# Patient Record
Sex: MALE | Race: WHITE | NOT HISPANIC OR LATINO | Employment: FULL TIME | URBAN - METROPOLITAN AREA
[De-identification: names, ages, dates, MRNs, and addresses within clinical notes are randomized per-mention and may not be internally consistent; named-entity substitution may affect disease eponyms.]

---

## 2023-09-06 ENCOUNTER — OFFICE VISIT (OUTPATIENT)
Dept: PRIMARY CARE | Facility: CLINIC | Age: 73
End: 2023-09-06
Payer: MEDICARE

## 2023-09-06 DIAGNOSIS — G47.9 SLEEP DISTURBANCE: Primary | ICD-10-CM

## 2023-09-06 DIAGNOSIS — G44.209 TENSION HEADACHE: ICD-10-CM

## 2023-09-06 DIAGNOSIS — R06.83 SNORING: ICD-10-CM

## 2023-09-06 DIAGNOSIS — G47.10 HYPERSOMNIA, UNSPECIFIED: ICD-10-CM

## 2023-09-06 DIAGNOSIS — N48.6 PEYRONIE DISEASE: ICD-10-CM

## 2023-09-06 PROCEDURE — 99213 OFFICE O/P EST LOW 20 MIN: CPT | Performed by: INTERNAL MEDICINE

## 2023-09-13 DIAGNOSIS — G47.9 SLEEP DISTURBANCE: Primary | ICD-10-CM

## 2023-09-27 PROBLEM — F41.8 DEPRESSION WITH ANXIETY: Status: ACTIVE | Noted: 2023-09-27

## 2023-09-27 PROBLEM — B36.0 TINEA VERSICOLOR: Status: ACTIVE | Noted: 2023-09-27

## 2023-09-27 PROBLEM — N20.0 NEPHROLITHIASIS: Status: ACTIVE | Noted: 2023-09-27

## 2023-09-27 PROBLEM — F41.9 ANXIETY: Status: ACTIVE | Noted: 2023-09-27

## 2023-09-27 PROBLEM — G47.00 INSOMNIA: Status: ACTIVE | Noted: 2023-09-27

## 2023-09-27 PROBLEM — M54.2 CERVICALGIA: Status: ACTIVE | Noted: 2023-09-27

## 2023-09-27 PROBLEM — R31.9 HEMATURIA: Status: ACTIVE | Noted: 2023-09-27

## 2023-09-27 PROBLEM — M19.071 PRIMARY OSTEOARTHRITIS OF BOTH FEET: Status: ACTIVE | Noted: 2023-09-27

## 2023-09-27 PROBLEM — K76.0 FATTY LIVER: Status: ACTIVE | Noted: 2023-09-27

## 2023-09-27 PROBLEM — N40.0 BENIGN PROSTATIC HYPERPLASIA: Status: ACTIVE | Noted: 2023-09-27

## 2023-09-27 PROBLEM — E78.49 ESSENTIAL FAMILIAL HYPERLIPIDEMIA: Status: ACTIVE | Noted: 2023-09-27

## 2023-09-27 PROBLEM — J45.909 ASTHMATIC BRONCHITIS (HHS-HCC): Status: ACTIVE | Noted: 2023-09-27

## 2023-09-27 PROBLEM — J45.909 REACTIVE AIRWAY DISEASE (HHS-HCC): Status: ACTIVE | Noted: 2023-09-27

## 2023-09-27 PROBLEM — M19.072 PRIMARY OSTEOARTHRITIS OF BOTH FEET: Status: ACTIVE | Noted: 2023-09-27

## 2023-09-27 PROBLEM — H69.90 EUSTACHIAN TUBE DYSFUNCTION: Status: ACTIVE | Noted: 2023-09-27

## 2023-09-27 PROBLEM — R42 DIZZINESS: Status: ACTIVE | Noted: 2023-09-27

## 2023-09-27 PROBLEM — R00.2 PALPITATIONS: Status: ACTIVE | Noted: 2023-09-27

## 2023-09-27 PROBLEM — N52.9 ED (ERECTILE DYSFUNCTION): Status: ACTIVE | Noted: 2023-09-27

## 2023-09-27 PROBLEM — I10 BENIGN ESSENTIAL HYPERTENSION: Status: ACTIVE | Noted: 2023-09-27

## 2023-09-27 PROBLEM — E29.1 PRIMARY MALE HYPOGONADISM: Status: ACTIVE | Noted: 2023-09-27

## 2023-09-27 PROBLEM — M10.9 GOUT: Status: ACTIVE | Noted: 2023-09-27

## 2023-09-27 RX ORDER — POTASSIUM CITRATE 15 MEQ/1
15 TABLET, EXTENDED RELEASE ORAL 2 TIMES DAILY
COMMUNITY
Start: 2020-12-07

## 2023-09-27 RX ORDER — COLCHICINE 0.6 MG/1
1 TABLET ORAL DAILY
COMMUNITY
Start: 2021-07-30

## 2023-09-27 RX ORDER — HYOSCYAMINE SULFATE 0.12 MG/1
0.12 TABLET, ORALLY DISINTEGRATING ORAL EVERY 4 HOURS PRN
COMMUNITY
Start: 2020-11-24

## 2023-09-27 RX ORDER — PHENYLPROPANOLAMINE/CLEMASTINE 75-1.34MG
TABLET, EXTENDED RELEASE ORAL EVERY 6 HOURS PRN
COMMUNITY

## 2023-09-27 RX ORDER — CITALOPRAM 20 MG/1
20 TABLET, FILM COATED ORAL
COMMUNITY
Start: 2014-01-06

## 2023-09-27 RX ORDER — CITALOPRAM 40 MG/1
40 TABLET, FILM COATED ORAL DAILY
COMMUNITY
End: 2024-02-15

## 2023-09-27 RX ORDER — KETOCONAZOLE 200 MG/1
200 TABLET ORAL DAILY
COMMUNITY

## 2023-09-27 RX ORDER — ALLOPURINOL 300 MG/1
300 TABLET ORAL DAILY
COMMUNITY
End: 2023-11-09 | Stop reason: SDUPTHER

## 2023-09-27 RX ORDER — ALBUTEROL SULFATE 90 UG/1
2 AEROSOL, METERED RESPIRATORY (INHALATION) AS NEEDED
COMMUNITY
Start: 2019-11-07

## 2023-09-27 RX ORDER — ARIPIPRAZOLE 5 MG/1
1 TABLET ORAL NIGHTLY
COMMUNITY
Start: 2018-01-29

## 2023-09-27 RX ORDER — IBUPROFEN 600 MG/1
1 TABLET ORAL EVERY 8 HOURS PRN
COMMUNITY
Start: 2019-11-07

## 2023-09-27 RX ORDER — DOCUSATE SODIUM 100 MG/1
100 CAPSULE, LIQUID FILLED ORAL 2 TIMES DAILY
COMMUNITY
Start: 2020-11-24

## 2023-09-27 RX ORDER — MONTELUKAST SODIUM 10 MG/1
10 TABLET ORAL
COMMUNITY
Start: 2022-07-30

## 2023-09-27 RX ORDER — CARISOPRODOL 350 MG/1
350 TABLET ORAL NIGHTLY PRN
COMMUNITY
Start: 2023-02-02

## 2023-09-27 RX ORDER — BLOOD-GLUCOSE METER
KIT MISCELLANEOUS
COMMUNITY
Start: 2023-02-13

## 2023-09-27 RX ORDER — BENZONATATE 100 MG/1
1 CAPSULE ORAL EVERY 6 HOURS PRN
COMMUNITY
Start: 2019-11-07

## 2023-09-27 RX ORDER — TAMSULOSIN HYDROCHLORIDE 0.4 MG/1
0.4 CAPSULE ORAL
COMMUNITY
Start: 2020-11-17

## 2023-09-27 RX ORDER — FLUTICASONE PROPIONATE AND SALMETEROL 250; 50 UG/1; UG/1
1 POWDER RESPIRATORY (INHALATION) 2 TIMES DAILY
COMMUNITY
Start: 2022-08-08

## 2023-09-27 RX ORDER — PREDNISONE 10 MG/1
1 TABLET ORAL 2 TIMES DAILY
COMMUNITY
Start: 2019-12-20

## 2023-09-27 RX ORDER — DOXYCYCLINE 100 MG/1
100 CAPSULE ORAL EVERY 12 HOURS
COMMUNITY
Start: 2019-12-20 | End: 2023-12-29 | Stop reason: SDUPTHER

## 2023-09-27 RX ORDER — SILDENAFIL 50 MG/1
1 TABLET, FILM COATED ORAL AS NEEDED
COMMUNITY
Start: 2017-11-20

## 2023-10-02 ENCOUNTER — APPOINTMENT (OUTPATIENT)
Dept: PULMONOLOGY | Facility: CLINIC | Age: 73
End: 2023-10-02
Payer: MEDICARE

## 2023-10-12 ENCOUNTER — TELEPHONE (OUTPATIENT)
Dept: PRIMARY CARE | Facility: CLINIC | Age: 73
End: 2023-10-12
Payer: MEDICARE

## 2023-10-16 ENCOUNTER — APPOINTMENT (OUTPATIENT)
Dept: UROLOGY | Facility: HOSPITAL | Age: 73
End: 2023-10-16
Payer: MEDICARE

## 2023-10-30 ENCOUNTER — APPOINTMENT (OUTPATIENT)
Dept: UROLOGY | Facility: HOSPITAL | Age: 73
End: 2023-10-30
Payer: MEDICARE

## 2023-11-09 DIAGNOSIS — Z00.00 HEALTH CARE MAINTENANCE: Primary | ICD-10-CM

## 2023-11-09 RX ORDER — ALLOPURINOL 300 MG/1
300 TABLET ORAL DAILY
Qty: 90 TABLET | Refills: 1 | Status: SHIPPED | OUTPATIENT
Start: 2023-11-09 | End: 2024-05-27

## 2023-12-18 ENCOUNTER — APPOINTMENT (OUTPATIENT)
Dept: UROLOGY | Facility: HOSPITAL | Age: 73
End: 2023-12-18
Payer: MEDICARE

## 2023-12-29 ENCOUNTER — OFFICE VISIT (OUTPATIENT)
Dept: PRIMARY CARE | Facility: CLINIC | Age: 73
End: 2023-12-29
Payer: MEDICARE

## 2023-12-29 VITALS — DIASTOLIC BLOOD PRESSURE: 76 MMHG | SYSTOLIC BLOOD PRESSURE: 124 MMHG

## 2023-12-29 DIAGNOSIS — F41.9 ANXIETY: ICD-10-CM

## 2023-12-29 DIAGNOSIS — G47.9 SLEEP DISTURBANCE: ICD-10-CM

## 2023-12-29 DIAGNOSIS — Z12.11 ENCOUNTER FOR SCREENING FOR MALIGNANT NEOPLASM OF COLON: ICD-10-CM

## 2023-12-29 DIAGNOSIS — R06.83 SNORING: ICD-10-CM

## 2023-12-29 DIAGNOSIS — J45.21 AB (ASTHMATIC BRONCHITIS), MILD INTERMITTENT, WITH ACUTE EXACERBATION (HHS-HCC): ICD-10-CM

## 2023-12-29 DIAGNOSIS — G47.10 HYPERSOMNIA, UNSPECIFIED: ICD-10-CM

## 2023-12-29 DIAGNOSIS — B36.0 TINEA VERSICOLOR: ICD-10-CM

## 2023-12-29 DIAGNOSIS — Z00.00 HEALTH CARE MAINTENANCE: Primary | ICD-10-CM

## 2023-12-29 PROCEDURE — 3074F SYST BP LT 130 MM HG: CPT | Performed by: INTERNAL MEDICINE

## 2023-12-29 PROCEDURE — 99214 OFFICE O/P EST MOD 30 MIN: CPT | Performed by: INTERNAL MEDICINE

## 2023-12-29 PROCEDURE — 3078F DIAST BP <80 MM HG: CPT | Performed by: INTERNAL MEDICINE

## 2023-12-29 RX ORDER — KETOCONAZOLE 200 MG/1
200 TABLET ORAL DAILY
Qty: 7 TABLET | Refills: 0 | Status: SHIPPED | OUTPATIENT
Start: 2023-12-29 | End: 2024-01-05

## 2023-12-29 RX ORDER — DOXYCYCLINE 100 MG/1
100 CAPSULE ORAL EVERY 12 HOURS
Qty: 14 CAPSULE | Refills: 0 | Status: SHIPPED | OUTPATIENT
Start: 2023-12-29

## 2023-12-29 NOTE — PROGRESS NOTES
Subjective   Patient ID: Roman Gorman is a 73 y.o. male who presents for No chief complaint on file..    HPI sick visit same day after hours no staff has had some cough x 3 weeks no chest pain no shortness of breath no fever has had outbreak of his tinea versicolor feels his Celexa is not working after 20 some years for his anxiety has not had any kidney stones desires he colonoscopy would like to do his home sleep study for sleep issues and tiredness    Review of Systems    Objective   There were no vitals taken for this visit.    Physical Exam vital signs noted alert and oriented x 3 NCAT no coryza nares without discharge OP benign no JVD chest clear to auscultation with some deep bronchial breath sounds no wheezing CV regular rate and rhythm S1-S2 without murmur gallop or rub extremities no clubbing cyanosis or edema normal distal pulses skin typical tinea versicolor    Assessment/Plan impression acute bacterial asthmatic bronchitis anxiety other diagnoses rule out sleep apnea TV  Plan okay for antibiotic to local pharmacy see EMR and Mucinex twice daily then after period of 2 to 3 days may use ketoconazole see EMR for the tinea versicolor  Then will deal with the Celexa and change and/or follow-up with psychiatry  Colonoscopy requisition (completed)  Consideration for home sleep study requisition to be made and follow-up (check)  Diet weight loss exercise and recheck based on above  consider inhaler if abx and mucinex are not enough for symptoms.  Recheck based on above  tt40 cc21

## 2024-01-08 DIAGNOSIS — Z12.11 SPECIAL SCREENING FOR MALIGNANT NEOPLASMS, COLON: ICD-10-CM

## 2024-01-08 RX ORDER — SODIUM PICOSULFATE, MAGNESIUM OXIDE, AND ANHYDROUS CITRIC ACID 12; 3.5; 1 G/175ML; G/175ML; MG/175ML
1 LIQUID ORAL SEE ADMIN INSTRUCTIONS
Qty: 175 ML | Refills: 0 | Status: SHIPPED | OUTPATIENT
Start: 2024-01-08 | End: 2024-02-23 | Stop reason: SDUPTHER

## 2024-01-11 ENCOUNTER — TELEPHONE (OUTPATIENT)
Dept: PRIMARY CARE | Facility: CLINIC | Age: 74
End: 2024-01-11
Payer: MEDICARE

## 2024-01-11 NOTE — TELEPHONE ENCOUNTER
Patient would like to return as a new patient last o.v. 01/21/2011, has been seeing Dr. Cabrera, cough, blood in stool, labs needed, Advise?

## 2024-01-16 ENCOUNTER — APPOINTMENT (OUTPATIENT)
Dept: PRIMARY CARE | Facility: CLINIC | Age: 74
End: 2024-01-16
Payer: MEDICARE

## 2024-02-13 DIAGNOSIS — F41.9 ANXIETY: Primary | ICD-10-CM

## 2024-02-15 RX ORDER — CITALOPRAM 40 MG/1
40 TABLET, FILM COATED ORAL DAILY
Qty: 90 TABLET | Refills: 1 | Status: SHIPPED | OUTPATIENT
Start: 2024-02-15

## 2024-02-23 DIAGNOSIS — Z12.11 SPECIAL SCREENING FOR MALIGNANT NEOPLASMS, COLON: ICD-10-CM

## 2024-02-23 RX ORDER — SODIUM PICOSULFATE, MAGNESIUM OXIDE, AND ANHYDROUS CITRIC ACID 12; 3.5; 1 G/175ML; G/175ML; MG/175ML
1 LIQUID ORAL SEE ADMIN INSTRUCTIONS
Qty: 175 ML | Refills: 0 | Status: SHIPPED | OUTPATIENT
Start: 2024-02-23

## 2024-02-26 ENCOUNTER — TELEPHONE (OUTPATIENT)
Dept: SLEEP MEDICINE | Facility: HOSPITAL | Age: 74
End: 2024-02-26

## 2024-02-26 ENCOUNTER — OFFICE VISIT (OUTPATIENT)
Dept: GASTROENTEROLOGY | Facility: EXTERNAL LOCATION | Age: 74
End: 2024-02-26
Payer: MEDICARE

## 2024-02-26 DIAGNOSIS — Z12.11 ENCOUNTER FOR SCREENING FOR MALIGNANT NEOPLASM OF COLON: ICD-10-CM

## 2024-02-26 DIAGNOSIS — K57.30 DIVERTICULOSIS OF LARGE INTESTINE WITHOUT DIVERTICULITIS: ICD-10-CM

## 2024-02-26 DIAGNOSIS — K64.8 INTERNAL HEMORRHOIDS: Primary | ICD-10-CM

## 2024-02-26 PROCEDURE — G0121 COLON CA SCRN NOT HI RSK IND: HCPCS | Performed by: INTERNAL MEDICINE

## 2024-02-27 DIAGNOSIS — G47.10 HYPERSOMNIA, UNSPECIFIED: Primary | ICD-10-CM

## 2024-02-27 NOTE — PROGRESS NOTES
Thank you for the update!    One of our schedulers will reach out to the patient to schedule the in-lab sleep study.    Thank you again for referring your patient(s) to  [facility] centers.    Best Regards,  The  Sleep Medicine Team    ===View-only below this line===  ----- Message -----  From: Chance Cabrera MD  Sent: 2/27/2024   1:03 AM EST  To: Paul Longoria  Subject: RE: Inconclusive Home Sleep Apnea Test (HSAT*    Ok to schedule inhouse sleep study; req made.  Thank you

## 2024-05-25 DIAGNOSIS — Z00.00 HEALTH CARE MAINTENANCE: ICD-10-CM

## 2024-05-27 RX ORDER — ALLOPURINOL 300 MG/1
300 TABLET ORAL DAILY
Qty: 90 TABLET | Refills: 1 | Status: SHIPPED | OUTPATIENT
Start: 2024-05-27

## 2024-05-28 ENCOUNTER — OFFICE VISIT (OUTPATIENT)
Dept: PRIMARY CARE | Facility: CLINIC | Age: 74
End: 2024-05-28
Payer: MEDICARE

## 2024-05-28 VITALS — SYSTOLIC BLOOD PRESSURE: 133 MMHG | DIASTOLIC BLOOD PRESSURE: 74 MMHG

## 2024-05-28 DIAGNOSIS — I10 BENIGN ESSENTIAL HYPERTENSION: ICD-10-CM

## 2024-05-28 DIAGNOSIS — F41.8 DEPRESSION WITH ANXIETY: Primary | ICD-10-CM

## 2024-05-28 PROCEDURE — 3075F SYST BP GE 130 - 139MM HG: CPT | Performed by: INTERNAL MEDICINE

## 2024-05-28 PROCEDURE — 99213 OFFICE O/P EST LOW 20 MIN: CPT | Performed by: INTERNAL MEDICINE

## 2024-05-28 PROCEDURE — 3078F DIAST BP <80 MM HG: CPT | Performed by: INTERNAL MEDICINE

## 2024-05-28 NOTE — PROGRESS NOTES
Subjective   Patient ID: Roman Gorman is a 74 y.o. male who presents for No chief complaint on file..    HPI Follow-up visit and sick visit same day after hours no staff no chest pain no shortness of breath had an event that work where became angry and subsequently was advised to undergo some anger management therapy no current chest pain or shortness of breath no physical characteristics or psychological characteristics not sleeping well and eating okay no dysuria    Review of Systems    Objective   There were no vitals taken for this visit.    Physical Exam vital signs noted alert and oriented x 3 NCAT no JVD or bruit chest clear to auscultation CV regular rate and rhythm S1-S2 extremities no clubbing cyanosis or edema normal distal pulses    Assessment/Plan     Impression depression with anxiety hypertension?  Other diagnoses  Plan okay for psychological services referral see EMR regarding anger management which patient is remorseful wishes to help with the process discussed with patient and wife good diet regular exercise increase water consumption no new medications are needed continue to monitor the blood pressure and weight no new medication yet sleep hygiene and follow-up based on above

## 2024-09-18 ENCOUNTER — CLINICAL SUPPORT (OUTPATIENT)
Dept: URGENT CARE | Age: 74
End: 2024-09-18
Payer: MEDICARE

## 2024-09-18 VITALS
SYSTOLIC BLOOD PRESSURE: 168 MMHG | TEMPERATURE: 97.4 F | DIASTOLIC BLOOD PRESSURE: 89 MMHG | OXYGEN SATURATION: 95 % | HEART RATE: 68 BPM

## 2024-09-19 ENCOUNTER — TELEPHONE (OUTPATIENT)
Dept: PRIMARY CARE | Facility: CLINIC | Age: 74
End: 2024-09-19

## 2024-09-19 ENCOUNTER — OFFICE VISIT (OUTPATIENT)
Dept: URGENT CARE | Age: 74
End: 2024-09-19
Payer: MEDICARE

## 2024-09-19 VITALS
DIASTOLIC BLOOD PRESSURE: 94 MMHG | BODY MASS INDEX: 46.75 KG/M2 | SYSTOLIC BLOOD PRESSURE: 144 MMHG | HEART RATE: 74 BPM | RESPIRATION RATE: 17 BRPM | WEIGHT: 240 LBS | OXYGEN SATURATION: 92 %

## 2024-09-19 DIAGNOSIS — I10 HYPERTENSION, UNSPECIFIED TYPE: Primary | ICD-10-CM

## 2024-09-19 ASSESSMENT — ENCOUNTER SYMPTOMS
HYPERTENSION: 1
HEADACHES: 1

## 2024-09-19 NOTE — PATIENT INSTRUCTIONS
Pressure at home you will need the larger cuff which is red instead of blue this could be giving you falsely elevated blood pressures.  Also decrease the amount of Motrin/ibuprofen you are taking down to 2 pills 3 times a day if needed for dental pain.  In between you can take Tylenol up to 1000 mg 3 times a day for the pain.  Watch any caffeine intake or sodium intake this could be falsely elevating your blood pressure also.  Follow-up with your doctor tomorrow at your 1215 visit and have him recheck your blood pressure and make a decision on if you need medications.  The blood pressure reading today is not extremely elevated it is slightly elevated above normal so I will defer any further treatment to your primary care.  If you develop any chest pain, shortness of breath, dizziness or blurred vision go to the ER immediately.

## 2024-09-19 NOTE — PROGRESS NOTES
Subjective   Patient ID: Roman Gorman is a 74 y.o. male. They present today with a chief complaint of Blood Pressure Check (Patient complains of elevated blood pressure X1 day . Patient states he had a dental procedure done yesterday , before and since the procedure he  states his blood pressure has been higher than normal . ).    History of Present Illness  HPI  Patient is a 74-year-old male who has been having issues with his teeth.  He had infection along with her root canal.  Multiple times at the dentist his blood pressure was elevated along with taking it at home.  Patient has been taking his blood pressure with a regular size cuff at home.  He denies any chest pain or shortness of breath.  No dizziness or blurred vision.  He came in because he was worried about his blood pressure.  He has an appointment with his primary care doctor tomorrow.  Past Medical History  Allergies as of 09/19/2024    (No Known Allergies)       (Not in a hospital admission)       Past Medical History:   Diagnosis Date    Personal history of other specified conditions 12/21/2019    History of fatigue       Past Surgical History:   Procedure Laterality Date    COLONOSCOPY  12/14/2015    Complete Colonoscopy    OTHER SURGICAL HISTORY  12/14/2015    Excision Of Lesion Face    TONSILLECTOMY  12/14/2015    Tonsillectomy            Review of Systems  Review of Systems  Gen: No fatigue, fever, sweats.  Head: No headache, trauma.  Eyes: No vision loss, double vision, drainage, eye pain.  ENT: No hearing changes, pain, epistaxis, congestion  Cardiac: No chest pain  Pulmonary: No shortness of breath,  pleuritic pain,   Heme/lymph: No swollen glands  GI: No abdominal pain, nausea, vomiting, diarrhea  : No  dysuria, frequency, urgency, hematuria  Musculoskeletal: No limb pain, joint pain, back pain, joint swelling or stiffness.  Skin: No rashes, pruritus, lumps, lesions.  Neuro: No Numbness, tingling, or weakness.  Psych: No  anxiety      Review of systems is otherwise negative unless stated above or in history of present illness.                             Objective    Vitals:    09/19/24 0955   BP: (!) 144/94   BP Location: Right arm   Patient Position: Sitting   Pulse: 74   Resp: 17   SpO2: 92%   Weight: 109 kg (240 lb)     No LMP for male patient.    Physical Exam  General: Vital signs stable, Pt is alert, no acute distress  Eyes: Conjunctiva normal, PERRL, EOMs intact  HENMT: Normocephalic, atraumatic, external ears and nose normal, no scars or masses.  No mastoid tenderness. Trachea is midline. No meningeal signs, negative Kernig and Brudzinski, moves neck freely.  No sinus tenderness  Resp: Respiratory effort is normal, no retractions, no stridor. Lungs CTA, no wheezes or rhonchi  CV: Heart is regular rate and rhythm.   Skin: No evidence of trauma, skin is warm and dry. No rashes, lesions or ulcers.  Skel: full range of motion of upper and lower extremities.   Neuro: Normal gait, CN II-XII intact, no motor or sensory changes.  Psych: Alert and oriented ×3, judgment is appropriate, normal mood and affect   Procedures    Point of Care Test & Imaging Results from this visit  No results found for this visit on 09/19/24.   No results found.    Diagnostic study results (if any) were reviewed by NEHA Razo.    Assessment/Plan   Allergies, medications, history, and pertinent labs/EKGs/Imaging reviewed by NEHA Razo.     Medical Decision Making  History and physical is consistent with slightly elevated blood pressure.  No signs of heart attack or stroke.  Patient normally has blood pressures that are in normal range when he goes to see his doctor.  More than likely blood pressure has been elevated due to his current situation of his dental infection and pain.  Will defer blood pressure treatment to his PCP but knows if he has any worsening symptoms to go to the ER.    Orders and Diagnoses  There are no diagnoses  linked to this encounter.    Medical Admin Record      Patient disposition: Home    Electronically signed by NEHA Razo  10:10 AM

## 2024-09-20 ENCOUNTER — OFFICE VISIT (OUTPATIENT)
Dept: PRIMARY CARE | Facility: CLINIC | Age: 74
End: 2024-09-20
Payer: MEDICARE

## 2024-09-20 VITALS — HEART RATE: 76 BPM | RESPIRATION RATE: 12 BRPM | SYSTOLIC BLOOD PRESSURE: 158 MMHG | DIASTOLIC BLOOD PRESSURE: 89 MMHG

## 2024-09-20 DIAGNOSIS — I10 BENIGN ESSENTIAL HYPERTENSION: Primary | ICD-10-CM

## 2024-09-20 PROCEDURE — 3079F DIAST BP 80-89 MM HG: CPT | Performed by: INTERNAL MEDICINE

## 2024-09-20 PROCEDURE — 3077F SYST BP >= 140 MM HG: CPT | Performed by: INTERNAL MEDICINE

## 2024-09-20 PROCEDURE — 99213 OFFICE O/P EST LOW 20 MIN: CPT | Performed by: INTERNAL MEDICINE

## 2024-09-20 RX ORDER — METOPROLOL TARTRATE 25 MG/1
25 TABLET, FILM COATED ORAL 2 TIMES DAILY
Qty: 60 TABLET | Refills: 1 | Status: SHIPPED | OUTPATIENT
Start: 2024-09-20 | End: 2025-03-19

## 2024-09-20 NOTE — PROGRESS NOTES
Subjective   Patient ID: Roman Gorman is a 74 y.o. male who presents for No chief complaint on file..    HPI follow-up visit no chest pain no shortness of breath no side effect with medication having endodontal procedures but blood pressure was elevated unable to have the procedure last blood pressure is adequate for their purposes patient himself had no symptoms blood pressures were in the 150s to 160 over around 100 diastolic    Review of Systems    Objective   There were no vitals taken for this visit.  Vital signs noted alert and oriented x 3 NCAT left side of mouth is swollen no JVD chest clear to auscultation cor regular rate and rhythm S1-S2 extremities no clubbing cyanosis or edema normal distal pulses  Physical Exam impression hypertension other diagnoses  Plan okay for metoprolol tartrate 25 mg 1 p.o. twice daily #60 refill 1 watch salt in diet exercise as able then may recheck some blood pressures at home follow-up with dental medicine and recheck afterwards follow-up continuing with same medicines 1 to 4 weeks for continuing care    Assessment/Plan

## 2024-09-24 ENCOUNTER — OFFICE VISIT (OUTPATIENT)
Dept: PRIMARY CARE | Facility: CLINIC | Age: 74
End: 2024-09-24
Payer: MEDICARE

## 2024-09-24 VITALS — DIASTOLIC BLOOD PRESSURE: 96 MMHG | RESPIRATION RATE: 12 BRPM | HEART RATE: 56 BPM | SYSTOLIC BLOOD PRESSURE: 151 MMHG

## 2024-09-24 DIAGNOSIS — E78.49 ESSENTIAL FAMILIAL HYPERLIPIDEMIA: ICD-10-CM

## 2024-09-24 DIAGNOSIS — F41.8 DEPRESSION WITH ANXIETY: ICD-10-CM

## 2024-09-24 DIAGNOSIS — I10 BENIGN ESSENTIAL HYPERTENSION: ICD-10-CM

## 2024-09-24 DIAGNOSIS — R73.02 GLUCOSE INTOLERANCE (IMPAIRED GLUCOSE TOLERANCE): ICD-10-CM

## 2024-09-24 DIAGNOSIS — Z00.00 HEALTH CARE MAINTENANCE: Primary | ICD-10-CM

## 2024-09-24 PROCEDURE — 99214 OFFICE O/P EST MOD 30 MIN: CPT | Performed by: INTERNAL MEDICINE

## 2024-09-24 PROCEDURE — 3077F SYST BP >= 140 MM HG: CPT | Performed by: INTERNAL MEDICINE

## 2024-09-24 PROCEDURE — 3080F DIAST BP >= 90 MM HG: CPT | Performed by: INTERNAL MEDICINE

## 2024-09-24 RX ORDER — AMLODIPINE BESYLATE 5 MG/1
5 TABLET ORAL DAILY
Qty: 30 TABLET | Refills: 5 | Status: SHIPPED | OUTPATIENT
Start: 2024-09-24 | End: 2025-03-23

## 2024-09-24 NOTE — PROGRESS NOTES
Subjective   Patient ID: Roman Gorman is a 74 y.o. male who presents for No chief complaint on file..    HPI follow-up visit no chest pain no shortness of breath Home blood pressures are down by about 20 points pulse rates in the 50s similar to today somewhat less stress trying to watch diet    Review of Systems    Objective   There were no vitals taken for this visit.    Physical Exam vital signs noted alert and oriented x 3 NCAT no JVD or bruit chest clear to auscultation CV regular rate and rhythm S1-S2 without murmur gallop or rub extremities no clubbing cyanosis or edema normal distal pulses    Assessment/Plan     Impression hypertension heart dx glucose intolerance  dep with anxiety other diagnoses hyperlipidemia  Plan he finds that when he takes his Celexa he is not dizzy when he does not take the Celexa he feels dizzy discussed with watching salt in the diet watching saturated fats watching carbohydrates check glycohemoglobin mildly elevated glucose in the past check lipid panel advised on cholesterol profile see prior check comprehensive panel pleasant glucose potassium and kidney function as well as liver function check TSH advised on thyroid and metabolism CT coronary score requisition made and follow-up with cardiology referral made and then recheck blood pressure is pulses weight lipid medication and other at return visit one month  tt40 cc21

## 2024-09-25 ENCOUNTER — LAB (OUTPATIENT)
Dept: LAB | Facility: LAB | Age: 74
End: 2024-09-25
Payer: MEDICARE

## 2024-09-25 DIAGNOSIS — I10 BENIGN ESSENTIAL HYPERTENSION: ICD-10-CM

## 2024-09-25 DIAGNOSIS — R73.02 GLUCOSE INTOLERANCE (IMPAIRED GLUCOSE TOLERANCE): ICD-10-CM

## 2024-09-25 LAB
ALBUMIN SERPL BCP-MCNC: 4.9 G/DL (ref 3.4–5)
ALP SERPL-CCNC: 40 U/L (ref 33–136)
ALT SERPL W P-5'-P-CCNC: 29 U/L (ref 10–52)
ANION GAP SERPL CALC-SCNC: 15 MMOL/L (ref 10–20)
AST SERPL W P-5'-P-CCNC: 19 U/L (ref 9–39)
BILIRUB SERPL-MCNC: 0.9 MG/DL (ref 0–1.2)
BUN SERPL-MCNC: 32 MG/DL (ref 6–23)
CALCIUM SERPL-MCNC: 10.3 MG/DL (ref 8.6–10.6)
CHLORIDE SERPL-SCNC: 102 MMOL/L (ref 98–107)
CHOLEST SERPL-MCNC: 198 MG/DL (ref 0–199)
CHOLESTEROL/HDL RATIO: 4.1
CO2 SERPL-SCNC: 26 MMOL/L (ref 21–32)
CREAT SERPL-MCNC: 1.26 MG/DL (ref 0.5–1.3)
EGFRCR SERPLBLD CKD-EPI 2021: 60 ML/MIN/1.73M*2
ERYTHROCYTE [DISTWIDTH] IN BLOOD BY AUTOMATED COUNT: 13.4 % (ref 11.5–14.5)
EST. AVERAGE GLUCOSE BLD GHB EST-MCNC: 126 MG/DL
GLUCOSE SERPL-MCNC: 109 MG/DL (ref 74–99)
HBA1C MFR BLD: 6 %
HCT VFR BLD AUTO: 48.3 % (ref 41–52)
HDLC SERPL-MCNC: 48.5 MG/DL
HGB BLD-MCNC: 15.7 G/DL (ref 13.5–17.5)
LDLC SERPL CALC-MCNC: 129 MG/DL
MCH RBC QN AUTO: 30.5 PG (ref 26–34)
MCHC RBC AUTO-ENTMCNC: 32.5 G/DL (ref 32–36)
MCV RBC AUTO: 94 FL (ref 80–100)
NON HDL CHOLESTEROL: 150 MG/DL (ref 0–149)
NRBC BLD-RTO: 0 /100 WBCS (ref 0–0)
PLATELET # BLD AUTO: 209 X10*3/UL (ref 150–450)
POTASSIUM SERPL-SCNC: 4.6 MMOL/L (ref 3.5–5.3)
PROT SERPL-MCNC: 7.1 G/DL (ref 6.4–8.2)
RBC # BLD AUTO: 5.15 X10*6/UL (ref 4.5–5.9)
SODIUM SERPL-SCNC: 138 MMOL/L (ref 136–145)
TRIGL SERPL-MCNC: 103 MG/DL (ref 0–149)
TSH SERPL-ACNC: 1.76 MIU/L (ref 0.44–3.98)
VLDL: 21 MG/DL (ref 0–40)
WBC # BLD AUTO: 6.8 X10*3/UL (ref 4.4–11.3)

## 2024-09-25 PROCEDURE — 80053 COMPREHEN METABOLIC PANEL: CPT

## 2024-09-25 PROCEDURE — 36415 COLL VENOUS BLD VENIPUNCTURE: CPT

## 2024-09-25 PROCEDURE — 84443 ASSAY THYROID STIM HORMONE: CPT

## 2024-09-25 PROCEDURE — 80061 LIPID PANEL: CPT

## 2024-09-25 PROCEDURE — 83036 HEMOGLOBIN GLYCOSYLATED A1C: CPT

## 2024-09-25 PROCEDURE — 85027 COMPLETE CBC AUTOMATED: CPT

## 2024-09-27 ENCOUNTER — OFFICE VISIT (OUTPATIENT)
Dept: CARDIOLOGY | Facility: CLINIC | Age: 74
End: 2024-09-27
Payer: MEDICARE

## 2024-09-27 ENCOUNTER — APPOINTMENT (OUTPATIENT)
Dept: CARDIOLOGY | Facility: CLINIC | Age: 74
End: 2024-09-27
Payer: MEDICARE

## 2024-09-27 VITALS
HEIGHT: 70 IN | HEART RATE: 55 BPM | OXYGEN SATURATION: 93 % | DIASTOLIC BLOOD PRESSURE: 84 MMHG | WEIGHT: 243.5 LBS | SYSTOLIC BLOOD PRESSURE: 130 MMHG | BODY MASS INDEX: 34.86 KG/M2

## 2024-09-27 DIAGNOSIS — E66.09 CLASS 1 OBESITY DUE TO EXCESS CALORIES WITHOUT SERIOUS COMORBIDITY WITH BODY MASS INDEX (BMI) OF 34.0 TO 34.9 IN ADULT: ICD-10-CM

## 2024-09-27 DIAGNOSIS — I10 BENIGN ESSENTIAL HYPERTENSION: Primary | ICD-10-CM

## 2024-09-27 LAB
ATRIAL RATE: 51 BPM
P AXIS: 45 DEGREES
P OFFSET: 146 MS
P ONSET: 94 MS
PR INTERVAL: 236 MS
Q ONSET: 212 MS
QRS COUNT: 8 BEATS
QRS DURATION: 88 MS
QT INTERVAL: 450 MS
QTC CALCULATION(BAZETT): 414 MS
QTC FREDERICIA: 426 MS
R AXIS: -47 DEGREES
T AXIS: 30 DEGREES
T OFFSET: 437 MS
VENTRICULAR RATE: 51 BPM

## 2024-09-27 PROCEDURE — 99214 OFFICE O/P EST MOD 30 MIN: CPT | Performed by: HOSPITALIST

## 2024-09-27 PROCEDURE — 3008F BODY MASS INDEX DOCD: CPT | Performed by: HOSPITALIST

## 2024-09-27 PROCEDURE — 1160F RVW MEDS BY RX/DR IN RCRD: CPT | Performed by: HOSPITALIST

## 2024-09-27 PROCEDURE — 1159F MED LIST DOCD IN RCRD: CPT | Performed by: HOSPITALIST

## 2024-09-27 PROCEDURE — 3075F SYST BP GE 130 - 139MM HG: CPT | Performed by: HOSPITALIST

## 2024-09-27 PROCEDURE — 99204 OFFICE O/P NEW MOD 45 MIN: CPT | Performed by: HOSPITALIST

## 2024-09-27 PROCEDURE — 3079F DIAST BP 80-89 MM HG: CPT | Performed by: HOSPITALIST

## 2024-09-27 PROCEDURE — 93005 ELECTROCARDIOGRAM TRACING: CPT | Performed by: HOSPITALIST

## 2024-09-27 ASSESSMENT — COLUMBIA-SUICIDE SEVERITY RATING SCALE - C-SSRS
1. IN THE PAST MONTH, HAVE YOU WISHED YOU WERE DEAD OR WISHED YOU COULD GO TO SLEEP AND NOT WAKE UP?: NO
6. HAVE YOU EVER DONE ANYTHING, STARTED TO DO ANYTHING, OR PREPARED TO DO ANYTHING TO END YOUR LIFE?: NO
2. HAVE YOU ACTUALLY HAD ANY THOUGHTS OF KILLING YOURSELF?: NO

## 2024-09-27 NOTE — PROGRESS NOTES
Subjective   Roman Gorman is a 74 y.o. male with PMH of HLD, HTN, ED, BPH, MDD, anxiety, asthma, and gout, who is here for evaluation for HTN.  Patient reports that he was getting dental work on his BP was 160/120.  Patient was prescribed metoprolol by his PCP, he noticed increased fatigue and heart rate in the 50s.  He was prescribed Norvasc 5 mg but has not started taking it yet.  Patient snores at night but no stopping breathing.  He does not take NSAIDs.  He admits to eat food rich in salt. Patient is on Metoprolol 25 mg bid and has not received his Amlodipine 5 mg yet.     /84, HR 55    NST 11/7/2019:  1. Normal stress myocardial perfusion imaging in response to pharmacologic stress with no definite evidence of ischemia or prior infarct.  2. Well-maintained left ventricular function.  3. Normal LV size and function with an estimated EF of 50%    EKG 9/27/2024: SB 51 bpm, 1st degree AVB  ms, left axis deviation.     Review of Systems  ROS is negative other than in HPI.      Objective   Physical Exam  General: NAD  HEENT: IEOM, PERRL   Neck: No JVD or carotid bruit  Lungs: CTAB  Heart: RRR, normal S1 and S2, no loud murmurs  Abdomen: Soft, nontender, positive bowel sounds  Extremities: No edema  Neurologic: No FND  Psychiatric: Normal mood and affect    Assessment/Plan   1-HTN:  -New diagnosis, will stop metoprolol due to symptomatic bradycardia with fatigue.  -Patient to start taking his amlodipine 5 mg once daily, patient check his BP for the next 10 days and share result with us.  -Low-salt diet.    2-obesity:  -Patient was educated about the importance of weight loss through healthy diet and and exercise.    RTC in 3 months.        Rolanda Elder MD

## 2024-09-27 NOTE — PATIENT INSTRUCTIONS
Thank you so much for visiting us today.    Please stop your metoprolol.    Please start taking amlodipine [Norvasc] 5 mg once daily for high blood pressure.    Please check your blood pressure numbers, morning and any, every day for the next 10 days and call us with the results.    Please do your best to exercise a regular basis, please add aerobic cardio exercises, start at 5 to 10 minutes a day, ideally 30 minutes 5 times a week.    Please do your best to lose weight through diet and exercise.  Look up Mediterranean diet.  Skip breakfast if you can,  a window of 10 hours to eat a day [10 AM to 6 PM for example], do not eat after 6 PM at night.  Avoid food rich with sugar and carbohydrates like bread, pasta, rice, and potatoes.  Count your calories and try to be 500-calorie deficient a day, this way would lose 1 pound a week [3500 tristin equals to 1 pound].    Will see you back in 3 months.  Please call us at [phone] if you need any help or you have questions.

## 2024-10-14 DIAGNOSIS — I10 BENIGN ESSENTIAL HYPERTENSION: ICD-10-CM

## 2024-10-15 RX ORDER — METOPROLOL TARTRATE 25 MG/1
25 TABLET, FILM COATED ORAL 2 TIMES DAILY
Qty: 60 TABLET | Refills: 1 | OUTPATIENT
Start: 2024-10-15 | End: 2025-04-13

## 2024-10-21 ENCOUNTER — APPOINTMENT (OUTPATIENT)
Dept: PRIMARY CARE | Facility: CLINIC | Age: 74
End: 2024-10-21
Payer: MEDICARE

## 2024-10-21 VITALS
DIASTOLIC BLOOD PRESSURE: 60 MMHG | HEART RATE: 77 BPM | SYSTOLIC BLOOD PRESSURE: 110 MMHG | HEIGHT: 70 IN | WEIGHT: 249 LBS | BODY MASS INDEX: 35.65 KG/M2

## 2024-10-21 DIAGNOSIS — Z00.00 MEDICARE ANNUAL WELLNESS VISIT, SUBSEQUENT: Primary | ICD-10-CM

## 2024-10-21 DIAGNOSIS — R73.03 PREDIABETES: ICD-10-CM

## 2024-10-21 DIAGNOSIS — Z76.89 ENCOUNTER TO ESTABLISH CARE: ICD-10-CM

## 2024-10-21 DIAGNOSIS — H66.90 ACUTE OTITIS MEDIA, UNSPECIFIED OTITIS MEDIA TYPE: ICD-10-CM

## 2024-10-21 DIAGNOSIS — G47.33 OSA (OBSTRUCTIVE SLEEP APNEA): ICD-10-CM

## 2024-10-21 DIAGNOSIS — R00.1 BRADYCARDIA: ICD-10-CM

## 2024-10-21 DIAGNOSIS — M1A.0790 CHRONIC IDIOPATHIC GOUT INVOLVING TOE WITHOUT TOPHUS, UNSPECIFIED LATERALITY: ICD-10-CM

## 2024-10-21 DIAGNOSIS — E78.49 OTHER HYPERLIPIDEMIA: ICD-10-CM

## 2024-10-21 DIAGNOSIS — F41.9 ANXIETY: ICD-10-CM

## 2024-10-21 DIAGNOSIS — N18.31 STAGE 3A CHRONIC KIDNEY DISEASE (MULTI): ICD-10-CM

## 2024-10-21 DIAGNOSIS — I10 BENIGN ESSENTIAL HYPERTENSION: ICD-10-CM

## 2024-10-21 DIAGNOSIS — Z23 NEED FOR PNEUMOCOCCAL 20-VALENT CONJUGATE VACCINATION: ICD-10-CM

## 2024-10-21 DIAGNOSIS — Z23 NEED FOR INFLUENZA VACCINATION: ICD-10-CM

## 2024-10-21 PROBLEM — Z20.822 EXPOSURE TO SEVERE ACUTE RESPIRATORY SYNDROME CORONAVIRUS 2 (SARS-COV-2): Status: RESOLVED | Noted: 2024-10-21 | Resolved: 2024-10-21

## 2024-10-21 PROBLEM — G44.209 TENSION TYPE HEADACHE: Status: ACTIVE | Noted: 2024-10-21

## 2024-10-21 PROBLEM — G47.9 DISTURBANCE IN SLEEP BEHAVIOR: Status: ACTIVE | Noted: 2024-10-21

## 2024-10-21 PROBLEM — R05.3 PERSISTENT COUGH: Status: RESOLVED | Noted: 2024-10-21 | Resolved: 2024-10-21

## 2024-10-21 PROBLEM — K92.1 HEMATOCHEZIA: Status: ACTIVE | Noted: 2024-10-21

## 2024-10-21 PROCEDURE — 3074F SYST BP LT 130 MM HG: CPT | Performed by: INTERNAL MEDICINE

## 2024-10-21 PROCEDURE — 1160F RVW MEDS BY RX/DR IN RCRD: CPT | Performed by: INTERNAL MEDICINE

## 2024-10-21 PROCEDURE — G0439 PPPS, SUBSEQ VISIT: HCPCS | Performed by: INTERNAL MEDICINE

## 2024-10-21 PROCEDURE — 99215 OFFICE O/P EST HI 40 MIN: CPT | Performed by: INTERNAL MEDICINE

## 2024-10-21 PROCEDURE — G0009 ADMIN PNEUMOCOCCAL VACCINE: HCPCS | Performed by: INTERNAL MEDICINE

## 2024-10-21 PROCEDURE — 3078F DIAST BP <80 MM HG: CPT | Performed by: INTERNAL MEDICINE

## 2024-10-21 PROCEDURE — 90677 PCV20 VACCINE IM: CPT | Performed by: INTERNAL MEDICINE

## 2024-10-21 PROCEDURE — 3008F BODY MASS INDEX DOCD: CPT | Performed by: INTERNAL MEDICINE

## 2024-10-21 PROCEDURE — G0008 ADMIN INFLUENZA VIRUS VAC: HCPCS | Performed by: INTERNAL MEDICINE

## 2024-10-21 PROCEDURE — 1124F ACP DISCUSS-NO DSCNMKR DOCD: CPT | Performed by: INTERNAL MEDICINE

## 2024-10-21 PROCEDURE — 1159F MED LIST DOCD IN RCRD: CPT | Performed by: INTERNAL MEDICINE

## 2024-10-21 PROCEDURE — 1036F TOBACCO NON-USER: CPT | Performed by: INTERNAL MEDICINE

## 2024-10-21 PROCEDURE — 90662 IIV NO PRSV INCREASED AG IM: CPT | Performed by: INTERNAL MEDICINE

## 2024-10-21 RX ORDER — LOSARTAN POTASSIUM 25 MG/1
25 TABLET ORAL DAILY
Qty: 30 TABLET | Refills: 1 | Status: SHIPPED | OUTPATIENT
Start: 2024-10-21 | End: 2024-10-23

## 2024-10-21 RX ORDER — AZITHROMYCIN 250 MG/1
TABLET, FILM COATED ORAL
Qty: 6 TABLET | Refills: 0 | Status: SHIPPED | OUTPATIENT
Start: 2024-10-21 | End: 2024-10-26

## 2024-10-21 NOTE — PROGRESS NOTES
Subjective   Reason for Visit: Roman Gorman is an 74 y.o. male here for a Medicare Wellness visit.     Past Medical, Surgical, and Family History reviewed and updated in chart.    Reviewed all medications by prescribing practitioner or clinical pharmacist (such as prescriptions, OTCs, herbal therapies and supplements) and documented in the medical record.  Patient is accompanied by his wife Edwina who is in the room with patient's consent to provide support.  RISHI Owens is a 74-year-old  male who comes to hospitals primary care and he is due for a Medicare annual wellness exam.  Labs done by previous provider last month were reviewed and discussed with patient-GFR was decreased and hemoglobin A1c was up at 6.0.  Patient was recently evaluated by cardiology and started on amlodipine 5 mg daily a few weeks ago for uncontrolled blood pressure.  Blood pressure continues to fluctuate per ambulatory blood pressure readings.  Medications were reviewed and updated in the medical record.  Patient is due for the high-dose flu vaccine as well as Prevnar 20 vaccination both of which she is agreeable to getting done today.  He has history of anxiety for which she has been taking Celexa for about 20 years and claims that this seems to help his symptoms and he does tend to have on and off situational anxiety.  No history of fever, chills, chest pain, shortness of breath, cough, dizziness, palpitations, syncope, abdominal pain, loss of appetite, loss of weight, nausea, vomiting, diarrhea, melena, rectal bleeding.  Patient does have history of sleep apnea but is currently not on any treatment because he could not tolerate the mask.  Patient complains of left ear discomfort for the past few days.  No genitourinary symptoms reported.  Patient is scheduled to see nephrology in December.  Patient was on metoprolol for hypertension but this was discontinued recently due to symptomatic bradycardia.  Gout has been stable with  "allopurinol and patient has not had any flareups recently.  Patient Care Team:  Maki Paniagua MD as PCP - General (Internal Medicine)  Chance Cabrera MD as PCP - [facility] Attributed Provider     Review of Systems  As per HPI.  Objective   Vitals:  /60 (BP Location: Right arm, Patient Position: Sitting, BP Cuff Size: Large adult)   Pulse 77   Ht 1.778 m (5' 10\")   Wt 113 kg (249 lb)   BMI 35.73 kg/m²       Physical Exam  General - well developed, well appearing, obese, elderly  male in no acute respiratory distress  Eyes - normal sclera and conjunctiva with no pallor or icterus, normal extraocular movements  ENT - normal external auditory canals and left tympanic membrane is erythematous, throat clear with no exudates  Neck - No JVD, thyromegaly or lymphadenopathy  Lungs - no respiratory distress and lungs clear to auscultation bilaterally  Heart - normal S1, S2 with normal heart rate, rhythm and no murmurs   Abdomen -obese, soft, nontender with no masses or organomegaly  Extremities - no cyanosis or pedal edema  Neuro - grossly normal neuro exam with no focal neuro deficits  Psych - normal mental status, mood and affect   Skin - no rashes or ulcers  MSK - normal gait with grossly normal ROM of major joints  Assessment & Plan  Encounter to establish care  Patient with multiple medical problems is here to establish care       Medicare annual wellness visit, subsequent  Labs have been done recently, Prevnar 20 and high-dose flu vaccine will be administered today, colonoscopy done earlier this year revealed diverticulosis and a 10-year follow-up was recommended       Need for influenza vaccination    Orders:    Flu vaccine, trivalent, preservative free, HIGH-DOSE, age 65y+ (Fluzone)    Need for pneumococcal 20-valent conjugate vaccination    Orders:    Pneumococcal conjugate vaccine, 20-valent (PREVNAR 20)    Anxiety  Patient will continue current dose of citalopram for now       Other " hyperlipidemia  Patient is currently not on any drug therapy, recent LDL was 129       Benign essential hypertension  Blood pressure is actually on the low side, I have advised patient to discontinue amlodipine and will be starting him on ARB to offer renal protection  Orders:    losartan (Cozaar) 25 mg tablet; Take 1 tablet (25 mg) by mouth once daily.    Prediabetes  Recent hemoglobin A1c was 6, diet has been discussed with patient       Bradycardia  Improved, current heart rate is normal, metoprolol was discontinued recently       Stage 3a chronic kidney disease (Multi)  Patient has an appointment to see nephrology later this year  Orders:    Albumin-Creatinine Ratio, Urine Random; Future    Acute otitis media, unspecified otitis media type    Orders:    azithromycin (Zithromax) 250 mg tablet; Take 2 tablets (500 mg) by mouth once daily for 1 day, THEN 1 tablet (250 mg) once daily for 4 days. Take 2 tabs (500 mg) by mouth today, than 1 daily for 4 days..    JOSE (obstructive sleep apnea)  Patient is currently not on any treatment for this issue  Orders:    Referral to Adult Sleep Medicine; Future    Chronic idiopathic gout involving toe without tophus, unspecified laterality  Patient is on allopurinol, stable  Orders:    Uric acid; Future    Follow-up in 6 to 8 weeks to reevaluate above issues.  49 minutes spent rooming the patient, reviewing records, eliciting history, examining patient, counseling, coordination of care and in documentation.  This note was partially generated using the Dragon voice recognition system. There may be some incorrect words, spelling and punctuation errors that were not corrected prior to committing the note to the patient's medical record.

## 2024-10-21 NOTE — ASSESSMENT & PLAN NOTE
Blood pressure is actually on the low side, I have advised patient to discontinue amlodipine and will be starting him on ARB to offer renal protection  Orders:    losartan (Cozaar) 25 mg tablet; Take 1 tablet (25 mg) by mouth once daily.

## 2024-10-22 ENCOUNTER — TELEPHONE (OUTPATIENT)
Dept: CARDIOLOGY | Facility: CLINIC | Age: 74
End: 2024-10-22
Payer: MEDICARE

## 2024-10-23 DIAGNOSIS — I10 BENIGN ESSENTIAL HYPERTENSION: ICD-10-CM

## 2024-10-23 RX ORDER — AMLODIPINE BESYLATE 5 MG/1
5 TABLET ORAL DAILY
Qty: 30 TABLET | Refills: 5 | Status: SHIPPED | OUTPATIENT
Start: 2024-10-23 | End: 2025-04-21

## 2024-10-24 ENCOUNTER — LAB (OUTPATIENT)
Dept: LAB | Facility: LAB | Age: 74
End: 2024-10-24
Payer: MEDICARE

## 2024-10-24 DIAGNOSIS — N18.31 STAGE 3A CHRONIC KIDNEY DISEASE (MULTI): ICD-10-CM

## 2024-10-24 DIAGNOSIS — M1A.0790 CHRONIC IDIOPATHIC GOUT INVOLVING TOE WITHOUT TOPHUS, UNSPECIFIED LATERALITY: ICD-10-CM

## 2024-10-24 LAB
CREAT UR-MCNC: 86.4 MG/DL (ref 20–370)
MICROALBUMIN UR-MCNC: <7 MG/L
MICROALBUMIN/CREAT UR: NORMAL MG/G{CREAT}
URATE SERPL-MCNC: 5.2 MG/DL (ref 4–7.5)

## 2024-10-24 PROCEDURE — 82043 UR ALBUMIN QUANTITATIVE: CPT

## 2024-10-24 PROCEDURE — 84550 ASSAY OF BLOOD/URIC ACID: CPT

## 2024-10-24 PROCEDURE — 82570 ASSAY OF URINE CREATININE: CPT

## 2024-10-24 PROCEDURE — 36415 COLL VENOUS BLD VENIPUNCTURE: CPT

## 2024-11-07 DIAGNOSIS — F41.9 ANXIETY: ICD-10-CM

## 2024-11-08 RX ORDER — CITALOPRAM 40 MG/1
40 TABLET, FILM COATED ORAL DAILY
Qty: 90 TABLET | Refills: 1 | Status: SHIPPED | OUTPATIENT
Start: 2024-11-08

## 2024-11-30 DIAGNOSIS — Z00.00 HEALTH CARE MAINTENANCE: ICD-10-CM

## 2024-12-03 DIAGNOSIS — Z00.00 HEALTH CARE MAINTENANCE: ICD-10-CM

## 2024-12-04 RX ORDER — ALLOPURINOL 300 MG/1
300 TABLET ORAL DAILY
Qty: 90 TABLET | Refills: 1 | OUTPATIENT
Start: 2024-12-04

## 2024-12-09 ENCOUNTER — APPOINTMENT (OUTPATIENT)
Dept: PRIMARY CARE | Facility: CLINIC | Age: 74
End: 2024-12-09
Payer: MEDICARE

## 2024-12-09 VITALS
DIASTOLIC BLOOD PRESSURE: 80 MMHG | WEIGHT: 252 LBS | SYSTOLIC BLOOD PRESSURE: 118 MMHG | HEIGHT: 70 IN | HEART RATE: 60 BPM | BODY MASS INDEX: 36.08 KG/M2

## 2024-12-09 DIAGNOSIS — G47.33 OSA (OBSTRUCTIVE SLEEP APNEA): ICD-10-CM

## 2024-12-09 DIAGNOSIS — N18.31 STAGE 3A CHRONIC KIDNEY DISEASE (MULTI): ICD-10-CM

## 2024-12-09 DIAGNOSIS — E66.9 OBESITY (BMI 30-39.9): ICD-10-CM

## 2024-12-09 DIAGNOSIS — E78.49 OTHER HYPERLIPIDEMIA: ICD-10-CM

## 2024-12-09 DIAGNOSIS — F41.9 ANXIETY: ICD-10-CM

## 2024-12-09 DIAGNOSIS — R73.03 PREDIABETES: ICD-10-CM

## 2024-12-09 DIAGNOSIS — I10 BENIGN ESSENTIAL HYPERTENSION: Primary | ICD-10-CM

## 2024-12-09 DIAGNOSIS — M1A.0790 CHRONIC IDIOPATHIC GOUT INVOLVING TOE WITHOUT TOPHUS, UNSPECIFIED LATERALITY: ICD-10-CM

## 2024-12-09 PROCEDURE — 3079F DIAST BP 80-89 MM HG: CPT | Performed by: INTERNAL MEDICINE

## 2024-12-09 PROCEDURE — 1036F TOBACCO NON-USER: CPT | Performed by: INTERNAL MEDICINE

## 2024-12-09 PROCEDURE — 99214 OFFICE O/P EST MOD 30 MIN: CPT | Performed by: INTERNAL MEDICINE

## 2024-12-09 PROCEDURE — 3074F SYST BP LT 130 MM HG: CPT | Performed by: INTERNAL MEDICINE

## 2024-12-09 PROCEDURE — 3008F BODY MASS INDEX DOCD: CPT | Performed by: INTERNAL MEDICINE

## 2024-12-09 PROCEDURE — G2211 COMPLEX E/M VISIT ADD ON: HCPCS | Performed by: INTERNAL MEDICINE

## 2024-12-09 PROCEDURE — 1159F MED LIST DOCD IN RCRD: CPT | Performed by: INTERNAL MEDICINE

## 2024-12-09 PROCEDURE — 1160F RVW MEDS BY RX/DR IN RCRD: CPT | Performed by: INTERNAL MEDICINE

## 2024-12-09 RX ORDER — SEMAGLUTIDE 0.25 MG/.5ML
0.25 INJECTION, SOLUTION SUBCUTANEOUS WEEKLY
Qty: 2 ML | Refills: 0 | Status: SHIPPED | OUTPATIENT
Start: 2024-12-09 | End: 2024-12-31

## 2024-12-09 RX ORDER — AMLODIPINE BESYLATE 10 MG/1
10 TABLET ORAL DAILY
Qty: 90 TABLET | Refills: 1 | Status: SHIPPED | OUTPATIENT
Start: 2024-12-09 | End: 2025-06-07

## 2024-12-09 NOTE — PROGRESS NOTES
"Subjective   Patient ID: Roman Gorman is a 74 y.o. male who presents for Follow-up.    HPI   Roman is a 74-year-old  male who comes today for a follow-up visit.  Since his last visit, patient has been taking 2 x 5 mg amlodipine as his blood pressure readings remain elevated after his visit with me.  Gout is stable and patient takes allopurinol.  Mood is also stable on current dose of Celexa.  Patient is scheduled to see nephrology for CKD in the near future and he is also scheduled to see sleep medicine at some point for history of untreated JOSE.  Labs done in September 2024 were once again reviewed and discussed with patient-hemoglobin A1c was slightly up and LDL was also elevated.  Patient complains of difficulty losing weight and he would like to start drug treatment to aid in weight loss and he is specifically interested in starting one of the injectables.  Review of Systems  As per HPI.  Objective   /80 (BP Location: Right arm, Patient Position: Sitting, BP Cuff Size: Large adult)   Pulse 60   Ht 1.778 m (5' 10\")   Wt 114 kg (252 lb)   BMI 36.16 kg/m²     Physical Exam  General - well developed, well appearing, obese, elderly  male in no acute respiratory distress  Eyes - normal sclera and conjunctiva with no pallor or icterus, normal extraocular movements  Neck - No JVD, thyromegaly or lymphadenopathy  Lungs - no respiratory distress and lungs clear to auscultation bilaterally  Heart - normal S1, S2 with normal rhythm and no murmurs   Abdomen -obese, soft, nontender with no masses or organomegaly  Extremities - no cyanosis or pedal edema  Neuro - grossly normal neuro exam with no focal neuro deficits  Psych - normal mental status, mood and affect   Skin - no rashes or ulcers  MSK - normal gait with grossly normal ROM of major joints  Assessment/Plan        1.  Hypertension-blood pressure is controlled, refill provided for amlodipine 10 mg daily  2.  Anxiety-stable and patient will " continue current dose of Celexa  3.  Prediabetes-diet and exercise discussed and encouraged, Wegovy 0.25 mg weekly will be started pending insurance coverage  4.  Hyperlipidemia-patient is currently not on any drug therapy, lipids will be checked at follow-up  5.  Stage IIIa CKD-patient will be seeing nephrology in the near future  6.  Chronic gout-stable, last uric acid level was normal, patient will continue allopurinol  7.  Obstructive sleep apnea-patient is currently not on any treatment and will be seeing sleep medicine  8.  Obesity with a BMI of over 30-diet and exercise discussed and encouraged, Wegovy 0.25 mg weekly will be initiated to aid in weight loss  Follow-up in 3 months or sooner if needed.  30 minutes spent rooming the patient, reviewing records, eliciting history, examining patient, counseling, coordination of care and in documentation.  This note was partially generated using the Dragon voice recognition system. There may be some incorrect words, spelling and punctuation errors that were not corrected prior to committing the note to the patient's medical record.

## 2024-12-13 DIAGNOSIS — Z00.00 HEALTH CARE MAINTENANCE: ICD-10-CM

## 2024-12-13 RX ORDER — ALLOPURINOL 300 MG/1
300 TABLET ORAL DAILY
Qty: 90 TABLET | Refills: 1 | Status: SHIPPED | OUTPATIENT
Start: 2024-12-13

## 2025-01-15 ENCOUNTER — APPOINTMENT (OUTPATIENT)
Dept: RADIOLOGY | Facility: HOSPITAL | Age: 75
End: 2025-01-15
Payer: MEDICARE

## 2025-05-15 DIAGNOSIS — F41.9 ANXIETY: ICD-10-CM

## 2025-05-15 RX ORDER — CITALOPRAM 40 MG/1
40 TABLET, FILM COATED ORAL DAILY
Qty: 90 TABLET | Refills: 1 | Status: SHIPPED | OUTPATIENT
Start: 2025-05-15

## 2025-06-21 DIAGNOSIS — I10 BENIGN ESSENTIAL HYPERTENSION: ICD-10-CM

## 2025-06-21 DIAGNOSIS — Z00.00 HEALTH CARE MAINTENANCE: ICD-10-CM

## 2025-06-23 RX ORDER — AMLODIPINE BESYLATE 10 MG/1
10 TABLET ORAL DAILY
Qty: 90 TABLET | Refills: 1 | Status: SHIPPED | OUTPATIENT
Start: 2025-06-23

## 2025-06-23 RX ORDER — ALLOPURINOL 300 MG/1
300 TABLET ORAL DAILY
Qty: 90 TABLET | Refills: 1 | Status: SHIPPED | OUTPATIENT
Start: 2025-06-23